# Patient Record
Sex: MALE | Race: WHITE | Employment: FULL TIME | URBAN - METROPOLITAN AREA
[De-identification: names, ages, dates, MRNs, and addresses within clinical notes are randomized per-mention and may not be internally consistent; named-entity substitution may affect disease eponyms.]

---

## 2022-09-13 ENCOUNTER — OFFICE VISIT (OUTPATIENT)
Dept: ENDOCRINOLOGY | Age: 22
End: 2022-09-13
Payer: COMMERCIAL

## 2022-09-13 VITALS
HEART RATE: 72 BPM | DIASTOLIC BLOOD PRESSURE: 85 MMHG | HEIGHT: 67 IN | WEIGHT: 156 LBS | RESPIRATION RATE: 16 BRPM | SYSTOLIC BLOOD PRESSURE: 123 MMHG | BODY MASS INDEX: 24.48 KG/M2

## 2022-09-13 DIAGNOSIS — R20.0 NUMBNESS IN BOTH HANDS: ICD-10-CM

## 2022-09-13 PROCEDURE — 99204 OFFICE O/P NEW MOD 45 MIN: CPT | Performed by: INTERNAL MEDICINE

## 2022-09-13 PROCEDURE — 95250 CONT GLUC MNTR PHYS/QHP EQP: CPT | Performed by: INTERNAL MEDICINE

## 2022-09-13 RX ORDER — GLUCAGON 3 MG/1
POWDER NASAL
Qty: 2 EACH | Refills: 3 | Status: SHIPPED | OUTPATIENT
Start: 2022-09-13

## 2022-09-13 RX ORDER — INSULIN GLARGINE 100 [IU]/ML
10 INJECTION, SOLUTION SUBCUTANEOUS NIGHTLY
COMMUNITY
Start: 2022-08-08

## 2022-09-13 NOTE — PROGRESS NOTES
Dexcom pro placed on the right arm with no issues. Sensor activated and patient set up Dexcom account on phone. Will print his data off in 10 days.

## 2022-09-13 NOTE — PROGRESS NOTES
Marlo Snellen is a 25 y.o. male is referred to me by Roxanne Farris NP for evaluation and management of uncontrolled type I diabetes. Marlo Snellen was diagnosed with Diabetes mellitus at age 22 . Aprox march 2021 . Since both parents are diabetic with mom being type I and dad having type 2 diabetes they picked up the symptoms. At the time of diagnosis patient had polyuria polydipsia . Marlo Snellen got diabetic education in the past.  Comorbid conditions: none  Patient was sent home on basal bolus insulin but finds it hard to take meal boluses correctly as he is concerned about having hypoglycemia. Patient also has vitamin D deficiency and was prescribed supplement in the past.    INTERIM:    Diabetes  He presents for his initial diabetic visit. He has type 1 diabetes mellitus. No MedicAlert identification noted. The initial diagnosis of diabetes was made 2 years ago. His disease course has been improving. Hypoglycemia symptoms include confusion and dizziness. Associated symptoms include polyuria. There are no hypoglycemic complications. There are no diabetic complications. Risk factors for coronary artery disease include male sex. Current diabetic treatment includes insulin injections. His weight is stable. He is following a generally unhealthy diet. He has not had a previous visit with a dietitian. He participates in exercise intermittently. His breakfast blood glucose is taken between 7-8 am. An ACE inhibitor/angiotensin II receptor blocker is not being taken. He does not see a podiatrist.Eye exam is not current. Lantus 10 units   Novolog as per Carbs 9--15       Past Medical History:   Diagnosis Date    Type 1 diabetes mellitus without complication (HCC)       There is no problem list on file for this patient. History reviewed. No pertinent surgical history.   Social History     Socioeconomic History    Marital status: Single     Spouse name: Not on file    Number of children: Not on file    Years of education: Not on file    Highest education level: Not on file   Occupational History    Not on file   Tobacco Use    Smoking status: Never    Smokeless tobacco: Never   Vaping Use    Vaping Use: Never used   Substance and Sexual Activity    Alcohol use: Yes     Comment: socially    Drug use: Never    Sexual activity: Not on file   Other Topics Concern    Not on file   Social History Narrative    Not on file     Social Determinants of Health     Financial Resource Strain: Not on file   Food Insecurity: Not on file   Transportation Needs: Not on file   Physical Activity: Not on file   Stress: Not on file   Social Connections: Not on file   Intimate Partner Violence: Not on file   Housing Stability: Not on file     Family History   Problem Relation Age of Onset    Diabetes Type 1  Mother     Diabetes type 2  Father      Current Outpatient Medications   Medication Sig Dispense Refill    insulin glargine (LANTUS SOLOSTAR) 100 UNIT/ML injection pen Inject 10 Units into the skin nightly      insulin aspart (NOVOLOG) 100 UNIT/ML injection pen Inject 15-20 Units into the skin 3 times daily (before meals)      vitamin D (CHOLECALCIFEROL) 50668 UNIT CAPS Take 50,000 Units by mouth once a week       No current facility-administered medications for this visit. No Known Allergies  Family Status   Relation Name Status    Mother  Alive    Father  Alive       Review of Systems  I have reviewed the review of system questionnaire filled by the patient .   Patient was advised to contact PCP for non endocrine signs and symptoms       OBJECTIVE:  /85   Pulse 72   Resp 16   Ht 5' 7\" (1.702 m)   Wt 156 lb (70.8 kg)   BMI 24.43 kg/m²    Wt Readings from Last 3 Encounters:   09/13/22 156 lb (70.8 kg)       Constitutional: no acute distress, well appearing and well nourished  Psychiatric: oriented to person, place and time, judgement and insight and normal, recent and remote memory and intact and mood and affect are normal  Skin: skin and subcutaneous tissue is normal without mass, normal turgor  Head and Face: examination of head and face revealed no abnormalities  Eyes: no lid or conjunctival swelling, erythema or discharge, pupils are normal  Ears/Nose: external inspection of ears and nose revealed no abnormalities, hearing is grossly normal  Oropharynx/Mouth/Face: lips, tongue and gums are normal with no lesions, the voice quality was normal  Neck: neck is supple and symmetric, with midline trachea and no masses, thyroid is normal  Lymphatics: normal cervical lymph nodes, normal supraclavicular nodes  Pulmonary: no increased work of breathing or signs of respiratory distress, lungs are clear to auscultation  Cardiovascular: normal heart rate and rhythm, normal S1 and S2, no murmurs   Abdomen: abdomen is soft, non-tender with no masses  Musculoskeletal: normal gait and station . Neurological: normal coordination and normal general cortical function    No results found for: LABA1C      ASSESSMENT/PLAN:  1. Uncontrolled type 1 diabetes, uncontrolled, with neuropathy ([address]     I had a lengthy discussion with the patient about  his  uncontrolled diabetes. We discussed progression of diabetes in detail and also the incidence of complications associated with uncontrolled diabetes. Patient is currently taking Lantus 10 units nightly along with NovoLog which mostly he is guessing the dose approximately 9 to 15 units of NovoLog with each meal or sometimes after eating. His control is suboptimal this was discussed with the patient in detail. Will start her continuous glucose sensor patient is still not sure if he wants to pursue glucose sensor at this point or not. We had a very lengthy discussion about how to take his Premeal boluses we will start him on Lantus 12 units and start taking meal boluses according to carb to insulin ratio of 15:1 and his new sliding scale insulin was provided to the patient.   -Most of the visit was counseling the patient on how to use his insulin as well as defining the goals along with management of his uncontrolled diabetes. Hypoglycemia protocol reviewed in detail with patient Patient was advised to carry glucose tablets and also have glucagon emergency kit. Baqsimi was ordered for him      Patient was advised that sending of his fingerstick blood glucose logs is crucial in management of his  diabetes. I will adjust the  doses of diabetic medications  according to sent data. Health Maintenance       Last Eye Exam: advised to have annual dilated eye exam.  Last Podiatry Exam: Foot care discussed with the patient  Lipids: We will check cholesterol at his follow-up visit  Microalbumin/Creatinine Ratio: I have ordered MA with next lab work     . Education: Reviewed ABCs of diabetes management (respective goals in parentheses): A1C (<7), blood pressure (<130/80), and cholesterol (LDL <100). Additional Education: referred to Diabetes Educator.    - Comprehensive Metabolic Panel; Future  - Hemoglobin A1C; Future  - Lipid Panel; Future  - Microalbumin / Creatinine Urine Ratio; Future  - TSH; Future    Patient complains of numbness in his thumb index finger and middle finger occasionally not all the time    Reviewed and/or ordered clinical lab results yes   Reviewed and/or ordered radiology tests Yes  Reviewed and/or ordered other diagnostic tests yes   Made a decision to obtain old records yes   Reviewed and summarized old records yes     Alon Felix was counseled regarding symptoms of current diagnosis, course and complications of disease if inadequately treated, side effects of medications, diagnosis, treatment options, and prognosis, risks, benefits, complications, and alternatives of treatment, labs, imaging and other studies and treatment targets and goals.   He understands instructions and counseling    Total time spent counseling 60 min  , more than 50 % of this was spent on face to face counseling    Return in about 3 months (around 12/13/2022). Please note that some or all of this report was generated using voice recognition software. Please notify me in case of any questions about the content of this document, as some errors in transcription may have occurred .

## 2022-09-13 NOTE — PATIENT INSTRUCTIONS
GOALS - 1. HBA1C (3MONTH AVG) SHOULD BE LESS THAN 6.5     PLEASE CHECK YOUR SUGARS:   BEFORE BREAKFAST  BEFORE LUNCH  BEFORE DINNER  BEDTIME  AFTER MEALS    2. FINGERSTICKS SHOULD BE   BEFORE MEALS <   AFTER MEALS < 140   BEDTIME >100     3. CARBOHYDRATES  MEALS 40--60 G  SNACKS 15 - 20 G    4. BLOOD PRESSURE  < 130/80    --- Start lantus  12  units.     ---Start taking short acting insulin (humalog/novolog) according to carbohydrate to insulin ratio 15  gm of carb = 1 unit of short acting insulin,                                        +    Sliding Scale Insulin for short acting insulin   If BS > 120 -150 take 1 additional units of fast actinginsulin   if --180 take 2 units   181-210 take 3 Units  211-240 take 4 Units   241--270 take 5 Units   271- 300 take 6 Units

## 2022-09-22 ENCOUNTER — TELEPHONE (OUTPATIENT)
Dept: ENDOCRINOLOGY | Age: 22
End: 2022-09-22

## 2022-09-22 ENCOUNTER — TELEPHONE (OUTPATIENT)
Dept: ENDOCRINOLOGY | Age: 22
End: 2022-09-22
Payer: COMMERCIAL

## 2022-09-22 DIAGNOSIS — R20.0 NUMBNESS IN BOTH HANDS: ICD-10-CM

## 2022-09-22 PROCEDURE — 95251 CONT GLUC MNTR ANALYSIS I&R: CPT | Performed by: INTERNAL MEDICINE

## 2022-09-22 RX ORDER — BLOOD-GLUCOSE TRANSMITTER
EACH MISCELLANEOUS
Qty: 1 EACH | Refills: 3 | Status: SHIPPED | OUTPATIENT
Start: 2022-09-22

## 2022-09-22 RX ORDER — BLOOD-GLUCOSE,RECEIVER,CONT
EACH MISCELLANEOUS
Qty: 1 EACH | Refills: 0 | Status: SHIPPED | OUTPATIENT
Start: 2022-09-22

## 2022-09-22 RX ORDER — BLOOD-GLUCOSE SENSOR
EACH MISCELLANEOUS
Qty: 3 EACH | Refills: 5 | Status: SHIPPED | OUTPATIENT
Start: 2022-09-22

## 2022-09-22 NOTE — TELEPHONE ENCOUNTER
Diabetes Continuous Glucose Monitoring Report         Reason for Study:     - improve diabetic control without risk of hypoglycemia       Current Medication regimen:   Basal bolus insulin regimen     CGMS Report     CGMS data collection was performed on September 22  Yojana 7 ent provided information on his  diet, activities and insulin dosing  during this period. Data was available for   days     Sensor Data Report:   - 12 AM to 6 AM: Overnight blood glucose pattern shows wide fluctuations in glucose  - 6   AM to 10 AM:  Post breakfast hyperglycemia is noted  --10AM to 5 PM : No hypoglycemia observed during this time. - 5   PM to 8 PM: Post meal hyperglycemia is noted       Average reading 244 mg/dL     Standard Dev 87 mg/dL   % of time <70 mg/dL 1%   % of time >180  mg/dL 74%   % of time within range 25%  Number of hypoglycemia episodes noted: 0     Impression:   CGMS shows wide fluctuations in glucose most likely due to not taking meal boluses correctly     Recommendation:      Patient was advised to make the following changes in his diabetic regimen  Take meal boluses 10 minutes prior to eating.

## 2022-09-22 NOTE — TELEPHONE ENCOUNTER
Spoke to patients mom and discussed the Dexcom.  We are going to try to submit the Dexcom through the pharmacy first and if it will not go through the pharmacy then we will send through [facility].

## 2022-09-22 NOTE — TELEPHONE ENCOUNTER
Pt's mom calling and states the pt really likes the Dexcom and would like to go ahead w/ the system.  She said not to use [facility] but to go w/ [facility]. Told mom to contact [facility]. Mom states he uses his phone for his Dexcom readings so he will not need that device    # [phone]  Elo

## 2022-09-23 ENCOUNTER — TELEPHONE (OUTPATIENT)
Dept: ENDOCRINOLOGY | Age: 22
End: 2022-09-23

## 2022-09-23 NOTE — TELEPHONE ENCOUNTER
Patient's mom called. Says the orders for these items need to be sent to [facility].

## 2023-02-22 ENCOUNTER — OFFICE VISIT (OUTPATIENT)
Dept: ENDOCRINOLOGY | Age: 23
End: 2023-02-22
Payer: COMMERCIAL

## 2023-02-22 VITALS
DIASTOLIC BLOOD PRESSURE: 99 MMHG | WEIGHT: 174 LBS | BODY MASS INDEX: 27.31 KG/M2 | SYSTOLIC BLOOD PRESSURE: 138 MMHG | RESPIRATION RATE: 16 BRPM | HEIGHT: 67 IN | HEART RATE: 77 BPM

## 2023-02-22 DIAGNOSIS — E10.65 POORLY CONTROLLED TYPE 1 DIABETES MELLITUS (HCC): Primary | ICD-10-CM

## 2023-02-22 DIAGNOSIS — E55.9 VITAMIN D DEFICIENCY: ICD-10-CM

## 2023-02-22 DIAGNOSIS — R20.0 NUMBNESS IN BOTH HANDS: ICD-10-CM

## 2023-02-22 DIAGNOSIS — E10.9 TYPE 1 DIABETES MELLITUS WITHOUT COMPLICATION (HCC): ICD-10-CM

## 2023-02-22 LAB — HBA1C MFR BLD: 8 %

## 2023-02-22 PROCEDURE — 83036 HEMOGLOBIN GLYCOSYLATED A1C: CPT | Performed by: INTERNAL MEDICINE

## 2023-02-22 PROCEDURE — 99215 OFFICE O/P EST HI 40 MIN: CPT | Performed by: INTERNAL MEDICINE

## 2023-02-22 PROCEDURE — 3052F HG A1C>EQUAL 8.0%<EQUAL 9.0%: CPT | Performed by: INTERNAL MEDICINE

## 2023-02-22 PROCEDURE — 95251 CONT GLUC MNTR ANALYSIS I&R: CPT | Performed by: INTERNAL MEDICINE

## 2023-02-22 RX ORDER — GLUCAGON 3 MG/1
POWDER NASAL
Qty: 2 EACH | Refills: 3 | Status: SHIPPED | OUTPATIENT
Start: 2023-02-22

## 2023-02-22 NOTE — PROGRESS NOTES
Billy Reese is a 26 y.o. male is seen for  management of uncontrolled type I diabetes. Billy Reese was diagnosed with Diabetes mellitus at age 22 . Aprox march 2021 . Since both parents are diabetic with mom being type I and dad having type 2 diabetes they picked up on the symptoms. At the time of diagnosis patient had polyuria polydipsia . Billy Reese got diabetic education in the past.  Comorbid conditions: none  Patient was sent home on basal bolus insulin but finds it hard to take meal boluses correctly as he is concerned about having hypoglycemia. Patient also has vitamin D deficiency and was prescribed supplement in the past.    INTERIM:    Diabetes  He presents for his follow-up diabetic visit. He has type 1 diabetes mellitus. No MedicAlert identification noted. The initial diagnosis of diabetes was made 2 years ago. His disease course has been improving. Hypoglycemia symptoms include confusion and dizziness. Associated symptoms include polyuria. There are no hypoglycemic complications. There are no diabetic complications. Risk factors for coronary artery disease include male sex. Current diabetic treatment includes insulin injections. His weight is stable. He is following a generally unhealthy diet. He has not had a previous visit with a dietitian. He participates in exercise intermittently. His breakfast blood glucose is taken between 7-8 am. An ACE inhibitor/angiotensin II receptor blocker is not being taken. He does not see a podiatrist.Eye exam is not current. Has wide fluctuation in glucose   Works third shift ---worse glucose during work   [employer] at work and sometimes forgets to take meal doses still not interested in insulin pump.   Denies any chest pain shortness of breath or headache      Past Medical History:   Diagnosis Date    Type 1 diabetes mellitus without complication [facility])       Patient Active Problem List   Diagnosis    Poorly controlled type 1 diabetes mellitus ([facility] [address] Vitamin D deficiency    Numbness in both hands       History reviewed. No pertinent surgical history. Social History     Socioeconomic History    Marital status: Single     Spouse name: Not on file    Number of children: Not on file    Years of education: Not on file    Highest education level: Not on file   Occupational History    Not on file   Tobacco Use    Smoking status: Never    Smokeless tobacco: Never   Vaping Use    Vaping Use: Never used   Substance and Sexual Activity    Alcohol use: Yes     Comment: socially    Drug use: Never    Sexual activity: Not on file   Other Topics Concern    Not on file   Social History Narrative    Not on file     Social Determinants of Health     Financial Resource Strain: Not on file   Food Insecurity: Not on file   Transportation Needs: Not on file   Physical Activity: Not on file   Stress: Not on file   Social Connections: Not on file   Intimate Partner Violence: Not on file   Housing Stability: Not on file     Family History   Problem Relation Age of Onset    Diabetes Type 1  Mother     Diabetes type 2  Father      Current Outpatient Medications   Medication Sig Dispense Refill    VITAMIN D PO Take by mouth      Glucagon (BAQSIMI ONE PACK) 3 MG/DOSE POWD To be used in case of severe hypoglycemia 2 each 3    Continuous Blood Gluc  (DEXCOM G6 ) ADELFO Check glucose 1 each 0    Continuous Blood Gluc Sensor (DEXCOM G6 SENSOR) MISC Change every 10 days 3 each 5    Continuous Blood Gluc Transmit (DEXCOM G6 TRANSMITTER) MISC To check glucose change one every 3 months 1 each 3    insulin glargine (LANTUS SOLOSTAR) 100 UNIT/ML injection pen Inject 15 Units into the skin nightly      insulin aspart (NOVOLOG) 100 UNIT/ML injection pen Inject 15-20 Units into the skin 3 times daily (before meals)       No current facility-administered medications for this visit.      No Known Allergies  Family Status   Relation Name Status    Mother  Alive    Father  Alive       Review of Systems  I have reviewed the review of system questionnaire filled by the patient . Patient was advised to contact PCP for non endocrine signs and symptoms       OBJECTIVE:  BP (!) 138/99   Pulse 77   Resp 16   Ht 5' 7\" (1.702 m)   Wt 174 lb (78.9 kg)   BMI 27.25 kg/m²    Wt Readings from Last 3 Encounters:   02/22/23 174 lb (78.9 kg)   09/13/22 156 lb (70.8 kg)       Constitutional: no acute distress, well appearing and well nourished  Psychiatric: oriented to person, place and time, judgement and insight and normal, recent and remote memory and intact and mood and affect are normal  Skin: skin and subcutaneous tissue is normal without mass, normal turgor  Head and Face: examination of head and face revealed no abnormalities  Eyes: no lid or conjunctival swelling, erythema or discharge, pupils are normal  Ears/Nose: external inspection of ears and nose revealed no abnormalities, hearing is grossly normal  Oropharynx/Mouth/Face: lips, tongue and gums are normal with no lesions, the voice quality was normal  Neck: neck is supple and symmetric, with midline trachea and no masses, thyroid is normal  Lymphatics: normal cervical lymph nodes, normal supraclavicular nodes  Pulmonary: no increased work of breathing or signs of respiratory distress, lungs are clear to auscultation  Cardiovascular: normal heart rate and rhythm, normal S1 and S2, Musculoskeletal: normal gait and station . Neurological: normal coordination and normal general cortical function    Lab Results   Component Value Date/Time    LABA1C 8.0 02/22/2023 11:24 AM         ASSESSMENT/PLAN:      ---- Uncontrolled type 1 diabetes, uncontrolled, with neuropathy. A1c 10.3 in July 2022    Aic 8.0 in feb 2023     I reviewed his continuous glucose sensor data with him in detail and made appropriate changes in his regimen.   I stressed that he needs to take his meal boluses 10 to 15 minutes before eating  Patient is currently taking Lantus 15  units nightly --he will uptitrated to 16 units  --Advised to stick with carb to insulin ratio of 15:1 and his new sliding scale insulin was provided to the patient.  -Seems like there is some component of fear of hypoglycemia leading to higher glucose values when he is working overnight. We discussed strategies to mitigate that    Hypoglycemia protocol reviewed in detail with patient Patient was advised to carry glucose tablets and also have glucagon emergency kit. Baqsimi was ordered for him      Patient was advised that sending of his fingerstick blood glucose logs is crucial in management of his  diabetes. I will adjust the  doses of diabetic medications  according to sent data. Health Maintenance       Last Eye Exam: advised to have annual dilated eye exam.  Last Podiatry Exam: Foot care discussed with the patient  Lipids: We will check cholesterol at his follow-up visit, orders were given at last visit which was never done  Microalbumin/Creatinine Ratio: I have ordered MA with next lab work, previously normal in April 2022 done at [facility]    Elevated blood pressure advised to keep a log at home and let me know if stays elevated. Vitamin D deficiency  Level was 15 in July 2022  He is taking OTC     Patient complains of numbness in his thumb index finger and middle finger occasionally not all the time  Resolved now     Reviewed and/or ordered clinical lab results yes   Reviewed and/or ordered radiology tests Yes  Reviewed and/or ordered other diagnostic tests yes   Made a decision to obtain old records yes   Reviewed and summarized old records yes     Kishore Musa was counseled regarding symptoms of current diagnosis, course and complications of disease if inadequately treated, side effects of medications, diagnosis, treatment options, and prognosis, risks, benefits, complications, and alternatives of treatment, labs, imaging and other studies and treatment targets and goals.   He understands instructions and counseling    I spent  40 + minutes which includes reviewing patient chart , interpreting previous lab results  , discussing and providing counseling and coordinating care of patient's multiple health issues with  the patient. Return in about 3 months (around 5/22/2023). Please note that some or all of this report was generated using voice recognition software. Please notify me in case of any questions about the content of this document, as some errors in transcription may have occurred . Diabetes Continuous Glucose Monitoring Report         Reason for Study:     - improve diabetic control without risk of hypoglycemia       Current Medication regimen:   Basal bolus insulin regimen     CGMS Report     CGMS data collection was performed on February 22, 2023  Patient provided information on his  diet, activities and insulin dosing  during this period. Data was available for 14 days     Sensor Data Report:   - 12 AM to 6 AM: Overnight blood glucose pattern shows wide fluctuations post correctional hypoglycemia  - 6   AM to 10 AM:  Post breakfast hyperglycemia is noted  --10AM to 5 PM : No hypoglycemia observed during this time. - 5   PM to 8 PM: Post meal hyperglycemia is noted       Average reading 203 mg/dL     Standard Dev 73   mg/dL   % of time <70 mg/dL 2%   % of time >180  mg/dL 62%   % of time within range 36%  Number of hypoglycemia episodes noted: 0     Impression:   CGMS shows      Recommendation:      Patient was advised to make the following changes in his diabetic regimen  Advised to increase Lantus by 1 unit and take meal boluses 10 minutes prior to eating.

## 2023-05-03 ENCOUNTER — TELEPHONE (OUTPATIENT)
Dept: ENDOCRINOLOGY | Age: 23
End: 2023-05-03

## 2023-05-03 NOTE — TELEPHONE ENCOUNTER
2 faxes from [facility]     Need Prior Auth for [facility] G6 sensor & transmitter

## 2023-05-04 NOTE — TELEPHONE ENCOUNTER
Approvedtoday  CaseId:[id number];Status:Approved; Review Type:Prior Auth; Coverage Start Date:04/04/2023; Coverage End Date:05/03/2024;  Drug  Dexcom G6 Sensor  Form  [employer] Electronic PA Form ([id number])

## 2023-07-26 ENCOUNTER — OFFICE VISIT (OUTPATIENT)
Dept: ENDOCRINOLOGY | Age: 23
End: 2023-07-26
Payer: COMMERCIAL

## 2023-07-26 VITALS
DIASTOLIC BLOOD PRESSURE: 89 MMHG | RESPIRATION RATE: 14 BRPM | BODY MASS INDEX: 27.31 KG/M2 | HEIGHT: 67 IN | HEART RATE: 75 BPM | WEIGHT: 174 LBS | SYSTOLIC BLOOD PRESSURE: 123 MMHG | TEMPERATURE: 98 F

## 2023-07-26 DIAGNOSIS — E55.9 VITAMIN D DEFICIENCY: ICD-10-CM

## 2023-07-26 DIAGNOSIS — R03.0 ELEVATED BLOOD PRESSURE READING: ICD-10-CM

## 2023-07-26 DIAGNOSIS — E10.65 POORLY CONTROLLED TYPE 1 DIABETES MELLITUS (HCC): Primary | ICD-10-CM

## 2023-07-26 LAB — HBA1C MFR BLD: 7 %

## 2023-07-26 PROCEDURE — 95251 CONT GLUC MNTR ANALYSIS I&R: CPT | Performed by: INTERNAL MEDICINE

## 2023-07-26 PROCEDURE — 99214 OFFICE O/P EST MOD 30 MIN: CPT | Performed by: INTERNAL MEDICINE

## 2023-07-26 PROCEDURE — 3051F HG A1C>EQUAL 7.0%<8.0%: CPT | Performed by: INTERNAL MEDICINE

## 2023-07-26 PROCEDURE — 83037 HB GLYCOSYLATED A1C HOME DEV: CPT | Performed by: INTERNAL MEDICINE

## 2023-07-26 NOTE — PROGRESS NOTES
Love Maldonado is a 26 y.o. male is seen for  management of uncontrolled type I diabetes. Love Maldonado was diagnosed with Diabetes mellitus at age 22 . Aprox march 2021 . Since both parents are diabetic with mom being type I and dad having type 2 diabetes they picked up on the symptoms. At the time of diagnosis patient had polyuria polydipsia . Love Maldonado got diabetic education in the past.  Comorbid conditions: none  Patient was sent home on basal bolus insulin but finds it hard to take meal boluses correctly as he is concerned about having hypoglycemia. Patient also has vitamin D deficiency and was prescribed supplement in the past.    INTERIM:    Diabetes  He presents for his follow-up diabetic visit. He has type 1 diabetes mellitus. No MedicAlert identification noted. The initial diagnosis of diabetes was made 2 years ago. His disease course has been improving. Hypoglycemia symptoms include confusion and dizziness. Associated symptoms include polyuria. There are no hypoglycemic complications. There are no diabetic complications. Risk factors for coronary artery disease include male sex. Current diabetic treatment includes insulin injections. His weight is stable. He is following a generally unhealthy diet. He has not had a previous visit with a dietitian. He participates in exercise intermittently. His breakfast blood glucose is taken between 7-8 am. An ACE inhibitor/angiotensin II receptor blocker is not being taken. He does not see a podiatrist.Eye exam is not current. Reviewed continuous glucose sensor data with the patient, tends to have postprandial hyperglycemia.   Sometimes skips meal boluses now works during the [address]        Past Medical History:   Diagnosis Date    Type 1 diabetes mellitus without complication ([address] )       Patient Active Problem List   Diagnosis    Poorly controlled type 1 diabetes mellitus (HCC)    Vitamin D deficiency    Numbness in both hands    Elevated blood pressure reading

## 2024-01-18 ENCOUNTER — TELEPHONE (OUTPATIENT)
Dept: ENDOCRINOLOGY | Age: 24
End: 2024-01-18

## 2024-01-18 DIAGNOSIS — E10.65 POOR CONTROL TYPE I DIABETES MELLITUS (HCC): ICD-10-CM

## 2024-01-18 RX ORDER — PROCHLORPERAZINE 25 MG/1
SUPPOSITORY RECTAL
Qty: 9 EACH | Refills: 5 | Status: SHIPPED | OUTPATIENT
Start: 2024-01-18

## 2024-01-18 RX ORDER — PROCHLORPERAZINE 25 MG/1
SUPPOSITORY RECTAL
Qty: 1 EACH | Refills: 0 | Status: SHIPPED | OUTPATIENT
Start: 2024-01-18

## 2024-01-18 RX ORDER — PROCHLORPERAZINE 25 MG/1
SUPPOSITORY RECTAL
Qty: 9 EACH | Refills: 5 | Status: SHIPPED | OUTPATIENT
Start: 2024-01-18 | End: 2024-01-18 | Stop reason: SDUPTHER

## 2024-01-18 NOTE — TELEPHONE ENCOUNTER
Pt parent called in and needs all dexcom supplies sent to [facility]. Pt has been completely out of all supplies for a few days and would like a sample. They will need the sensors

## 2024-01-29 ENCOUNTER — TELEPHONE (OUTPATIENT)
Dept: ENDOCRINOLOGY | Age: 24
End: 2024-01-29

## 2024-01-29 DIAGNOSIS — E10.65 POOR CONTROL TYPE I DIABETES MELLITUS (HCC): ICD-10-CM

## 2024-01-29 RX ORDER — PROCHLORPERAZINE 25 MG/1
SUPPOSITORY RECTAL
Qty: 1 EACH | Refills: 3 | Status: SHIPPED | OUTPATIENT
Start: 2024-01-29 | End: 2024-01-30 | Stop reason: SDUPTHER

## 2024-01-29 RX ORDER — PROCHLORPERAZINE 25 MG/1
SUPPOSITORY RECTAL
Qty: 9 EACH | Refills: 5 | Status: SHIPPED | OUTPATIENT
Start: 2024-01-29

## 2024-01-29 NOTE — TELEPHONE ENCOUNTER
Next OV 02/01/2024    Requested Prescriptions     Pending Prescriptions Disp Refills    Continuous Blood Gluc Sensor (DEXCOM G6 SENSOR) MISC 9 each 5     Sig: Change every 10 days    Continuous Blood Gluc Transmit (DEXCOM G6 TRANSMITTER) MISC 1 each 3     Sig: To check glucose change one every 3 months

## 2024-01-30 RX ORDER — PROCHLORPERAZINE 25 MG/1
SUPPOSITORY RECTAL
Qty: 1 EACH | Refills: 3 | Status: SHIPPED | OUTPATIENT
Start: 2024-01-30 | End: 2024-02-01 | Stop reason: SDUPTHER

## 2024-01-30 NOTE — TELEPHONE ENCOUNTER
Pt's mother calling and states pt has received his sensors but no transmitter to go with it from [employer]    CB# Aranza - mom # [phone]

## 2024-02-01 ENCOUNTER — OFFICE VISIT (OUTPATIENT)
Dept: ENDOCRINOLOGY | Age: 24
End: 2024-02-01

## 2024-02-01 DIAGNOSIS — R03.0 ELEVATED BLOOD PRESSURE READING: ICD-10-CM

## 2024-02-01 DIAGNOSIS — E10.65 POOR CONTROL TYPE I DIABETES MELLITUS (HCC): Primary | ICD-10-CM

## 2024-02-01 DIAGNOSIS — K62.3 RP (RECTAL PROLAPSE): ICD-10-CM

## 2024-02-01 RX ORDER — PROCHLORPERAZINE 25 MG/1
SUPPOSITORY RECTAL
Qty: 1 EACH | Refills: 3 | Status: SHIPPED | OUTPATIENT
Start: 2024-02-01 | End: 2024-02-01 | Stop reason: SDUPTHER

## 2024-02-01 NOTE — PROGRESS NOTES
List   Diagnosis    Poorly controlled type 1 diabetes mellitus (HCC)    Vitamin D deficiency    Numbness in both hands    Elevated blood pressure reading       History reviewed. No pertinent surgical history.  Social History     Socioeconomic History    Marital status: Single     Spouse name: Not on file    Number of children: Not on file    Years of education: Not on file    Highest education level: Not on file   Occupational History    Not on file   Tobacco Use    Smoking status: Never    Smokeless tobacco: Never   Vaping Use    Vaping Use: Never used   Substance and Sexual Activity    Alcohol use: Yes     Comment: socially    Drug use: Never    Sexual activity: Not on file   Other Topics Concern    Not on file   Social History Narrative    Not on file     Social Determinants of Health     Financial Resource Strain: Not on file   Food Insecurity: Not on file   Transportation Needs: Not on file   Physical Activity: Not on file   Stress: Not on file   Social Connections: Not on file   Intimate Partner Violence: Not on file   Housing Stability: Not on file     Family History   Problem Relation Age of Onset    Diabetes Type 1  Mother     Diabetes type 2  Father      Current Outpatient Medications   Medication Sig Dispense Refill    Continuous Blood Gluc Sensor (DEXCOM G6 SENSOR) MISC Change every 10 days 9 each 5    Continuous Blood Gluc  (DEXCOM G6 ) ADELFO Check glucose 1 each 0    VITAMIN D PO Take by mouth      Glucagon (BAQSIMI ONE PACK) 3 MG/DOSE POWD To be used in case of severe hypoglycemia 2 each 3    insulin glargine (LANTUS SOLOSTAR) 100 UNIT/ML injection pen Inject 16 Units into the skin nightly      insulin aspart (NOVOLOG) 100 UNIT/ML injection pen Inject 15-20 Units into the skin 3 times daily (before meals)       No current facility-administered medications for this visit.     No Known Allergies  Family Status   Relation Name Status    Mother  Alive    Father  Alive       OBJECTIVE:  BP

## 2024-02-02 VITALS
BODY MASS INDEX: 27.15 KG/M2 | WEIGHT: 173 LBS | SYSTOLIC BLOOD PRESSURE: 130 MMHG | HEIGHT: 67 IN | DIASTOLIC BLOOD PRESSURE: 89 MMHG | HEART RATE: 79 BPM

## 2024-08-29 ENCOUNTER — OFFICE VISIT (OUTPATIENT)
Dept: ENDOCRINOLOGY | Age: 24
End: 2024-08-29
Payer: COMMERCIAL

## 2024-08-29 DIAGNOSIS — E55.9 VITAMIN D DEFICIENCY: ICD-10-CM

## 2024-08-29 DIAGNOSIS — E10.65 POOR CONTROL TYPE I DIABETES MELLITUS (HCC): Primary | ICD-10-CM

## 2024-08-29 DIAGNOSIS — R03.0 ELEVATED BLOOD PRESSURE READING: ICD-10-CM

## 2024-08-29 LAB — HBA1C MFR BLD: 8 %

## 2024-08-29 PROCEDURE — 99214 OFFICE O/P EST MOD 30 MIN: CPT | Performed by: INTERNAL MEDICINE

## 2024-08-29 PROCEDURE — 95251 CONT GLUC MNTR ANALYSIS I&R: CPT | Performed by: INTERNAL MEDICINE

## 2024-08-29 PROCEDURE — 3052F HG A1C>EQUAL 8.0%<EQUAL 9.0%: CPT | Performed by: INTERNAL MEDICINE

## 2024-08-29 PROCEDURE — 83036 HEMOGLOBIN GLYCOSYLATED A1C: CPT | Performed by: INTERNAL MEDICINE

## 2024-08-29 RX ORDER — ACYCLOVIR 400 MG/1
TABLET ORAL
Qty: 9 EACH | Refills: 3 | Status: SHIPPED | OUTPATIENT
Start: 2024-08-29

## 2024-08-29 RX ORDER — INSULIN PMP CART,AUT,G6/7,CNTR
EACH SUBCUTANEOUS
Qty: 30 EACH | Refills: 3 | Status: SHIPPED | OUTPATIENT
Start: 2024-08-29

## 2024-08-29 RX ORDER — INSULIN PMP CART,AUT,G6/7,CNTR
EACH SUBCUTANEOUS
Qty: 1 KIT | Refills: 0 | Status: SHIPPED | OUTPATIENT
Start: 2024-08-29

## 2024-08-29 NOTE — PROGRESS NOTES
Boris Emanuel is a 24 y.o. male is seen for  management of uncontrolled type I diabetes. Boris Emanuel was diagnosed with Diabetes mellitus at age 21 . [facility] march 2021 .  Since both parents are diabetic with mom being type I and dad having type 2 diabetes they picked up on the symptoms.   At the time of diagnosis patient had polyuria polydipsia . Boris Emanuel got diabetic education in the past.  Comorbid conditions: none  Patient was sent home on basal bolus insulin but finds it hard to take meal boluses correctly as he is concerned about having hypoglycemia.  Patient also has vitamin D deficiency and was prescribed supplement in the past.  Had rectal prolapse surgery in jan 2024     INTERIM:    Diabetes  He presents for his follow-up diabetic visit. He has type 1 diabetes mellitus. No MedicAlert identification noted. The initial diagnosis of diabetes was made 2 years ago. His disease course has been improving. Hypoglycemia symptoms include confusion and dizziness. Associated symptoms include polyuria. There are no hypoglycemic complications. There are no diabetic complications. Risk factors for coronary artery disease include male sex. Current diabetic treatment includes insulin injections. His weight is stable. He is following a generally unhealthy diet. He has not had a previous visit with a dietitian. He participates in exercise intermittently. His breakfast blood glucose is taken between 7-8 am. An ACE inhibitor/angiotensin II receptor blocker is not being taken. He does not see a podiatrist.Eye exam is not current.      He has lost 10 lbs without trying   Continues to eat overnight due to poor sleep hygiene  Reviewed continuous glucose sensor data with the patient, tends to have postprandial hyperglycemia.          Past Medical History:   Diagnosis Date    Type 1 diabetes mellitus without complication (HCC)       Patient Active Problem List   Diagnosis    Poorly controlled type 1 diabetes mellitus (HCC)

## 2024-09-03 VITALS
HEART RATE: 89 BPM | HEIGHT: 67 IN | SYSTOLIC BLOOD PRESSURE: 138 MMHG | RESPIRATION RATE: 16 BRPM | WEIGHT: 163 LBS | DIASTOLIC BLOOD PRESSURE: 83 MMHG | BODY MASS INDEX: 25.58 KG/M2

## 2024-12-05 ENCOUNTER — OFFICE VISIT (OUTPATIENT)
Dept: ENDOCRINOLOGY | Age: 24
End: 2024-12-05

## 2024-12-05 VITALS
BODY MASS INDEX: 26.53 KG/M2 | WEIGHT: 169 LBS | RESPIRATION RATE: 14 BRPM | HEIGHT: 67 IN | SYSTOLIC BLOOD PRESSURE: 131 MMHG | HEART RATE: 97 BPM | DIASTOLIC BLOOD PRESSURE: 88 MMHG | TEMPERATURE: 98 F

## 2024-12-05 DIAGNOSIS — E10.65 POORLY CONTROLLED TYPE 1 DIABETES MELLITUS (HCC): ICD-10-CM

## 2024-12-05 DIAGNOSIS — R03.0 ELEVATED BLOOD PRESSURE READING: ICD-10-CM

## 2024-12-05 DIAGNOSIS — K62.3 RP (RECTAL PROLAPSE): ICD-10-CM

## 2024-12-05 DIAGNOSIS — E55.9 VITAMIN D DEFICIENCY: ICD-10-CM

## 2024-12-05 DIAGNOSIS — E10.65 POOR CONTROL TYPE I DIABETES MELLITUS (HCC): Primary | ICD-10-CM

## 2024-12-05 DIAGNOSIS — E10.65 POOR CONTROL TYPE I DIABETES MELLITUS (HCC): ICD-10-CM

## 2024-12-05 RX ORDER — INSULIN PMP CART,AUT,G6/7,CNTR
EACH SUBCUTANEOUS
Qty: 10 EACH | Refills: 5 | Status: SHIPPED | OUTPATIENT
Start: 2024-12-05

## 2024-12-05 RX ORDER — OXYCODONE HYDROCHLORIDE 5 MG/1
5 TABLET ORAL EVERY 6 HOURS PRN
COMMUNITY
Start: 2024-10-03 | End: 2024-12-05

## 2024-12-05 NOTE — PROGRESS NOTES
intact and mood and affect are normal  Skin: skin and subcutaneous tissue is normal without mass, normal turgor  Head and Face: examination of head and face revealed no abnormalities  Eyes: no lid or conjunctival swelling, erythema or discharge, pupils are normal  Ears/Nose: external inspection of ears and nose revealed no abnormalities, hearing is grossly normal  Oropharynx/Mouth/Face: lips, tongue and gums are normal with no lesions, the voice quality was normal  Neck: neck is supple and symmetric, with midline trachea and no masses, thyroid is normal  Lymphatics: normal cervical lymph nodes, normal supraclavicular nodes  Pulmonary: no increased work of breathing or signs of respiratory distress, lungs are clear to auscultation  Cardiovascular: normal heart rate and rhythm, normal S1 and S2, Musculoskeletal: normal gait and station .  Neurological: normal coordination and normal general cortical function    Lab Results   Component Value Date/Time    LABA1C 8.0 08/29/2024 11:10 AM    LABA1C 7.0 07/26/2023 11:35 AM    LABA1C 8.0 02/22/2023 11:24 AM         ASSESSMENT/PLAN:      ---- Uncontrolled type 1 diabetes, uncontrolled, with neuropathy.  A1c 10.3 in July 2022    A1c not at target 8.0  I reviewed his continuous glucose sensor data with him in detail and made appropriate changes in his regimen.  Advised to avoid snacks high in carbs overnight and work on diet and taking meal boluses limits prior to eating  I stressed that he needs to take his meal boluses 10 to 15 minutes before eating  Patient is currently taking Lantus 15  units nightly --he will uptitrated to 16 units  -- Take meal boluses 10 minutes prior to eating  -- Lantus 16 units   Hypoglycemia protocol reviewed in detail with patient Patient was advised to carry glucose tablets and also have glucagon emergency kit.  Baqsimi was ordered for him      Patient was advised that sending of his fingerstick blood glucose logs is crucial in management of his

## 2024-12-06 LAB
ALBUMIN SERPL-MCNC: 4.6 G/DL (ref 3.4–5)
ALBUMIN/GLOB SERPL: 1.9 {RATIO} (ref 1.1–2.2)
ALP SERPL-CCNC: 118 U/L (ref 40–129)
ALT SERPL-CCNC: 14 U/L (ref 10–40)
ANION GAP SERPL CALCULATED.3IONS-SCNC: 10 MMOL/L (ref 3–16)
AST SERPL-CCNC: 15 U/L (ref 15–37)
BILIRUB SERPL-MCNC: 0.4 MG/DL (ref 0–1)
BUN SERPL-MCNC: 9 MG/DL (ref 7–20)
CALCIUM SERPL-MCNC: 9.6 MG/DL (ref 8.3–10.6)
CHLORIDE SERPL-SCNC: 99 MMOL/L (ref 99–110)
CHOLEST SERPL-MCNC: 153 MG/DL (ref 0–199)
CO2 SERPL-SCNC: 26 MMOL/L (ref 21–32)
CREAT SERPL-MCNC: 0.8 MG/DL (ref 0.9–1.3)
EST. AVERAGE GLUCOSE BLD GHB EST-MCNC: 191.5 MG/DL
GFR SERPLBLD CREATININE-BSD FMLA CKD-EPI: >90 ML/MIN/{1.73_M2}
GLUCOSE SERPL-MCNC: 160 MG/DL (ref 70–99)
HBA1C MFR BLD: 8.3 %
HDLC SERPL-MCNC: 51 MG/DL (ref 40–60)
LDLC SERPL CALC-MCNC: 87 MG/DL
POTASSIUM SERPL-SCNC: 4 MMOL/L (ref 3.5–5.1)
PROT SERPL-MCNC: 7 G/DL (ref 6.4–8.2)
SODIUM SERPL-SCNC: 135 MMOL/L (ref 136–145)
TRIGL SERPL-MCNC: 76 MG/DL (ref 0–150)
TSH SERPL DL<=0.005 MIU/L-ACNC: 0.88 UIU/ML (ref 0.27–4.2)
VLDLC SERPL CALC-MCNC: 15 MG/DL

## 2025-03-28 ENCOUNTER — TELEPHONE (OUTPATIENT)
Dept: ENDOCRINOLOGY | Age: 25
End: 2025-03-28

## 2025-03-28 DIAGNOSIS — E10.65 POOR CONTROL TYPE I DIABETES MELLITUS (HCC): ICD-10-CM

## 2025-03-28 RX ORDER — PROCHLORPERAZINE 25 MG/1
SUPPOSITORY RECTAL
Qty: 1 EACH | Refills: 3 | Status: SHIPPED | OUTPATIENT
Start: 2025-03-28

## 2025-03-28 RX ORDER — PROCHLORPERAZINE 25 MG/1
SUPPOSITORY RECTAL
Qty: 9 EACH | Refills: 5 | Status: SHIPPED | OUTPATIENT
Start: 2025-03-28

## 2025-03-28 NOTE — TELEPHONE ENCOUNTER
Pt's mom calling and states pharmacy trying to get hold of office but pt is needing refill on Dexcom G 6 sensor & Transmitter    Pharmacy info - [facility]    CB# Laura [phone]

## 2025-07-09 ENCOUNTER — TELEPHONE (OUTPATIENT)
Dept: ENDOCRINOLOGY | Age: 25
End: 2025-07-09

## 2025-07-09 DIAGNOSIS — E10.65 POOR CONTROL TYPE I DIABETES MELLITUS (HCC): Primary | ICD-10-CM

## 2025-07-09 RX ORDER — ACYCLOVIR 400 MG/1
1 TABLET ORAL
Qty: 9 EACH | Refills: 1 | Status: SHIPPED | OUTPATIENT
Start: 2025-07-09

## 2025-07-09 NOTE — TELEPHONE ENCOUNTER
Medication:   Requested Prescriptions     Signed Prescriptions Disp Refills    Continuous Glucose Sensor (DEXCOM G7 SENSOR) MISC 9 each 1     Si each by Does not apply route every 10 days     Authorizing Provider: CHITO MARC     Ordering User: LAUREN MONTEMAYOR         Last appt: 2024   Next appt: Visit date not found    Last Labs DM:   Lab Results   Component Value Date/Time    LABA1C 8.3 2024 11:06 AM     Last Lipid:   Lab Results   Component Value Date/Time    CHOL 153 2024 11:06 AM    TRIG 76 2024 11:06 AM    HDL 51 2024 11:06 AM     Last PSA: No results found for: \"PSA\"  Last Thyroid:   Lab Results   Component Value Date/Time    TSH 0.88 2024 11:06 AM

## 2025-07-10 ENCOUNTER — OFFICE VISIT (OUTPATIENT)
Dept: ENDOCRINOLOGY | Age: 25
End: 2025-07-10
Payer: COMMERCIAL

## 2025-07-10 VITALS
DIASTOLIC BLOOD PRESSURE: 81 MMHG | HEIGHT: 67 IN | HEART RATE: 75 BPM | BODY MASS INDEX: 29.51 KG/M2 | SYSTOLIC BLOOD PRESSURE: 124 MMHG | WEIGHT: 188 LBS

## 2025-07-10 DIAGNOSIS — E10.65 POOR CONTROL TYPE I DIABETES MELLITUS (HCC): Primary | ICD-10-CM

## 2025-07-10 DIAGNOSIS — E10.65 POORLY CONTROLLED TYPE 1 DIABETES MELLITUS (HCC): ICD-10-CM

## 2025-07-10 DIAGNOSIS — E55.9 VITAMIN D DEFICIENCY: ICD-10-CM

## 2025-07-10 LAB — HBA1C MFR BLD: 7.2 %

## 2025-07-10 PROCEDURE — 83036 HEMOGLOBIN GLYCOSYLATED A1C: CPT | Performed by: INTERNAL MEDICINE

## 2025-07-10 PROCEDURE — 95251 CONT GLUC MNTR ANALYSIS I&R: CPT | Performed by: INTERNAL MEDICINE

## 2025-07-10 PROCEDURE — 99214 OFFICE O/P EST MOD 30 MIN: CPT | Performed by: INTERNAL MEDICINE

## 2025-07-10 PROCEDURE — 3051F HG A1C>EQUAL 7.0%<8.0%: CPT | Performed by: INTERNAL MEDICINE

## 2025-07-10 RX ORDER — GLUCAGON 3 MG/1
POWDER NASAL
Qty: 2 EACH | Refills: 3 | Status: SHIPPED | OUTPATIENT
Start: 2025-07-10

## 2025-07-10 NOTE — PROGRESS NOTES
2022  He is taking OTC  Previously was told to optimize vitamin D supplement patient      Reviewed and/or ordered clinical lab results yes   Reviewed and/or ordered radiology tests Yes  Reviewed and/or ordered other diagnostic tests yes   Made a decision to obtain old records yes   Reviewed and summarized old records yes     Boris Emanuel was counseled regarding symptoms of current diagnosis, course and complications of disease if inadequately treated, side effects of medications, diagnosis, treatment options, and prognosis, risks, benefits, complications, and alternatives of treatment, labs, imaging and other studies and treatment targets and goals.  He understands instructions and counseling      No follow-ups on file.    Please note that some or all of this report was generated using voice recognition software. Please notify me in case of any questions about the content of this document, as some errors in transcription may have occurred .       Diabetes Continuous Glucose Monitoring Report         Reason for Study:     - improve diabetic control without risk of hypoglycemia       Current Medication regimen:   Basal bolus insulin regimen     CGMS Report     CGMS data collection was performed on 07/10/25     Patient provided information on his  diet, activities and insulin dosing  during this period.   Data was available for 14 days     Sensor Data Report:   - 12 AM to 6 AM: Overnight blood glucose pattern shows wide fluctuations post correctional hypoglycemia  - 6   AM to 10 AM:  Post breakfast hyperglycemia is noted  --10AM to 5 PM : No hypoglycemia observed during this time.  - 5   PM to 8 PM: Post meal hyperglycemia is noted       Average reading 221 mg/dL     Standard Dev 63  mg/dL   % of time <70 mg/dL 0 %   % of time >180  mg/dL 76 %   % of time within range 24 %  Number of hypoglycemia episodes noted: 0     Impression:   CGMS shows wide fluctuation in glucose mostly diet related     Recommendation: